# Patient Record
Sex: MALE | Race: WHITE | NOT HISPANIC OR LATINO | Employment: FULL TIME | URBAN - METROPOLITAN AREA
[De-identification: names, ages, dates, MRNs, and addresses within clinical notes are randomized per-mention and may not be internally consistent; named-entity substitution may affect disease eponyms.]

---
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
## 2023-04-08 ENCOUNTER — HOSPITAL ENCOUNTER (EMERGENCY)
Facility: HOSPITAL | Age: 18
Discharge: HOME | End: 2023-04-08
Attending: EMERGENCY MEDICINE
Payer: MEDICAID

## 2023-04-08 VITALS
TEMPERATURE: 98 F | HEART RATE: 80 BPM | BODY MASS INDEX: 32.93 KG/M2 | DIASTOLIC BLOOD PRESSURE: 85 MMHG | HEIGHT: 70 IN | WEIGHT: 230 LBS | OXYGEN SATURATION: 99 % | RESPIRATION RATE: 20 BRPM | SYSTOLIC BLOOD PRESSURE: 118 MMHG

## 2023-04-08 DIAGNOSIS — S61.012A LACERATION OF LEFT THUMB WITHOUT FOREIGN BODY WITHOUT DAMAGE TO NAIL, INITIAL ENCOUNTER: Primary | ICD-10-CM

## 2023-04-08 PROCEDURE — 90471 IMMUNIZATION ADMIN: CPT | Performed by: EMERGENCY MEDICINE

## 2023-04-08 PROCEDURE — 99284 EMERGENCY DEPT VISIT MOD MDM: CPT

## 2023-04-08 PROCEDURE — 90715 TDAP VACCINE 7 YRS/> IM: CPT | Performed by: EMERGENCY MEDICINE

## 2023-04-08 PROCEDURE — 63600175 PHARM REV CODE 636 W HCPCS: Performed by: EMERGENCY MEDICINE

## 2023-04-08 RX ORDER — CEPHALEXIN 500 MG/1
500 CAPSULE ORAL EVERY 12 HOURS
Qty: 10 CAPSULE | Refills: 0 | Status: SHIPPED | OUTPATIENT
Start: 2023-04-08 | End: 2023-04-13

## 2023-04-08 RX ADMIN — TETANUS TOXOID, REDUCED DIPHTHERIA TOXOID AND ACELLULAR PERTUSSIS VACCINE, ADSORBED 0.5 ML: 5; 2.5; 8; 8; 2.5 SUSPENSION INTRAMUSCULAR at 01:04

## 2023-04-08 NOTE — ED PROVIDER NOTES
Encounter Date: 4/8/2023       History     Chief Complaint   Patient presents with    Finger Injury     Pt reports he cut his left thumb by a knife while fishing pta. No active bleeding. Thumb ROM intact.      Pt here with lac to his left thumb while cutting fish for bait. Mild pain.     The history is provided by the patient.   Laceration   The incident occurred 1 to 2 hours ago. The laceration is located on the Right hand. The laceration is 2 cm in size. The laceration mechanism was a a dirty knife. The pain is at a severity of 3/10. He reports no foreign bodies present. His tetanus status is out of date.   Review of patient's allergies indicates:  No Known Allergies  History reviewed. No pertinent past medical history.  History reviewed. No pertinent surgical history.  History reviewed. No pertinent family history.  Social History     Tobacco Use    Smoking status: Never    Smokeless tobacco: Never   Substance Use Topics    Alcohol use: Yes     Comment: occasionally     Drug use: Never     Review of Systems   Musculoskeletal:  Negative for joint swelling.   Neurological:  Negative for weakness and numbness.     Physical Exam     Initial Vitals [04/08/23 0124]   BP Pulse Resp Temp SpO2   123/82 83 20 98.1 °F (36.7 °C) 99 %      MAP       --         Physical Exam    Nursing note and vitals reviewed.  Constitutional: He appears well-developed and well-nourished. He is not diaphoretic. No distress.   Cardiovascular:  Normal rate, regular rhythm, normal heart sounds and intact distal pulses.           Pulmonary/Chest: Breath sounds normal.   Musculoskeletal:         General: No tenderness or edema. Normal range of motion.      Comments: Superficial 2cm lac to dorsum of left thumb over ip joint. No joint involvement. ROM intact. No infection.     Neurological: He is alert and oriented to person, place, and time. He has normal strength.   Skin: Skin is warm and dry. Capillary refill takes less than 2 seconds. No  erythema.       ED Course   Procedures  Labs Reviewed - No data to display       Imaging Results    None          Medications   Tdap (BOOSTRIX) vaccine injection 0.5 mL (has no administration in time range)     Medical Decision Making:   ED Management:  Pt with superficial lac to his left thumb not amenable to suture repair. Wound was cleaned and irrigated and dressed with sterile dressing. Rx prophylactic keflex. Return precautions discussed. Tetanus updated.                        Clinical Impression:   Final diagnoses:  [S61.012A] Laceration of left thumb without foreign body without damage to nail, initial encounter (Primary)        ED Disposition Condition    Discharge Stable          ED Prescriptions       Medication Sig Dispense Start Date End Date Auth. Provider    cephALEXin (KEFLEX) 500 MG capsule Take 1 capsule (500 mg total) by mouth every 12 (twelve) hours. for 5 days 10 capsule 4/8/2023 4/13/2023 Reggie Orozco Jr., MD          Follow-up Information       Follow up With Specialties Details Why Contact Info    [facility] Emergency Dept Emergency Medicine In 5 days For wound re-check [address] [facility] [address] [phone]             Reggie Orozco Jr., MD  04/08/23 0135

## 2023-04-08 NOTE — ED NOTES
Wound soaked for 10min with Betadyne and sterile 0.9% NaCl wash. Irrigated wound x 3 with sterile saline and all debris removed from wound with thorough inspection. Top layer of epidermis of approximately 90% remaining holding on to avulsion. Removed with # 11 blade scapel per MD. Cleansed wound again with irrigation and chlorhexadine. Pressure held to wound for 2 minutes and bleeding controlled. Covered with xeroform gauze and 2x2 with pressure held. Secured in place with kerlex wrap and tape. No bleeding noted to hand and cap refill remains < 2seconds and patient denies any tingling or pain distally to site. Wound care instructions provided and symptoms to watch for r/t s/s of infection and to return for new or worsening symptoms. Tolerated procedure well and without incident.